# Patient Record
Sex: MALE | NOT HISPANIC OR LATINO | URBAN - METROPOLITAN AREA
[De-identification: names, ages, dates, MRNs, and addresses within clinical notes are randomized per-mention and may not be internally consistent; named-entity substitution may affect disease eponyms.]

---

## 2019-05-15 ENCOUNTER — OFFICE (OUTPATIENT)
Dept: URBAN - METROPOLITAN AREA PATHOLOGY 4 | Facility: PATHOLOGY | Age: 24
End: 2019-05-15

## 2019-05-15 ENCOUNTER — AMBULATORY SURGICAL CENTER (OUTPATIENT)
Dept: URBAN - METROPOLITAN AREA SURGERY 20 | Facility: SURGERY | Age: 24
End: 2019-05-15

## 2019-05-15 DIAGNOSIS — K21.0 GASTRO-ESOPHAGEAL REFLUX DISEASE WITH ESOPHAGITIS: ICD-10-CM

## 2019-05-15 DIAGNOSIS — K29.50 UNSPECIFIED CHRONIC GASTRITIS WITHOUT BLEEDING: ICD-10-CM

## 2019-05-15 DIAGNOSIS — R12 HEARTBURN: ICD-10-CM

## 2019-05-15 PROBLEM — K29.70 GASTRITIS, UNSPECIFIED, WITHOUT BLEEDING: Status: ACTIVE | Noted: 2019-05-15

## 2019-05-15 LAB
GI HISTOLOGY: A. SELECT: (no result)
GI HISTOLOGY: B. SELECT: (no result)
GI HISTOLOGY: PDF REPORT: (no result)

## 2019-05-15 PROCEDURE — 88305 TISSUE EXAM BY PATHOLOGIST: CPT

## 2019-05-15 PROCEDURE — 43450 DILATE ESOPHAGUS 1/MULT PASS: CPT

## 2019-05-15 PROCEDURE — 43239 EGD BIOPSY SINGLE/MULTIPLE: CPT

## 2019-05-15 NOTE — SERVICEHPINOTES
JONN MCPHERSON  is a  23  male   who presents today for a  EGD   for   the indications listed below. The updated Patient Profile was reviewed prior to the procedure, in conjunction with the Physical Exam, including medical conditions, surgical procedures, medications, allergies, family history and social history. See Physical Exam time stamp below for date and time of HPI completion.Pre-operatively, I reviewed the indication(s) for the procedure, the risks of the procedure [including but not limited to: unexpected bleeding possibly requiring hospitalization and/or unplanned repeat procedures, perforation possibly requiring surgical treatment, missed lesions and complications of sedation/MAC (also explained by anesthesia staff)]. I have evaluated the patient for risks associated with the planned anesthesia and the procedure to be performed and find the patient an acceptable candidate for IV sedation.Multiple opportunities were provided for any questions or concerns, and all questions were answered satisfactorily before any anesthesia was administered. We will proceed with the planned procedure.BR

## 2019-07-13 ENCOUNTER — APPOINTMENT (OUTPATIENT)
Dept: GENERAL RADIOLOGY | Facility: HOSPITAL | Age: 24
End: 2019-07-13

## 2019-07-13 ENCOUNTER — HOSPITAL ENCOUNTER (EMERGENCY)
Facility: HOSPITAL | Age: 24
Discharge: HOME | End: 2019-07-14
Attending: EMERGENCY MEDICINE | Admitting: EMERGENCY MEDICINE

## 2019-07-13 DIAGNOSIS — F10.920 ACUTE ALCOHOLIC INTOXICATION WITHOUT COMPLICATION (HCC): ICD-10-CM

## 2019-07-13 DIAGNOSIS — R00.2 PALPITATIONS: Primary | ICD-10-CM

## 2019-07-13 LAB
ALBUMIN SERPL-MCNC: 4.4 G/DL (ref 3.5–5.2)
ALBUMIN/GLOB SERPL: 1.5 G/DL
ALP SERPL-CCNC: 75 U/L (ref 39–117)
ALT SERPL W P-5'-P-CCNC: <5 U/L (ref 1–41)
ANION GAP SERPL CALCULATED.3IONS-SCNC: 17.8 MMOL/L (ref 5–15)
AST SERPL-CCNC: 29 U/L (ref 1–40)
BASOPHILS # BLD AUTO: 0.04 10*3/MM3 (ref 0–0.2)
BASOPHILS NFR BLD AUTO: 0.5 % (ref 0–1.5)
BILIRUB SERPL-MCNC: <0.2 MG/DL (ref 0.2–1.2)
BUN BLD-MCNC: 14 MG/DL (ref 6–20)
BUN/CREAT SERPL: 10.9 (ref 7–25)
CALCIUM SPEC-SCNC: 8.9 MG/DL (ref 8.6–10.5)
CHLORIDE SERPL-SCNC: 104 MMOL/L (ref 98–107)
CO2 SERPL-SCNC: 21.2 MMOL/L (ref 22–29)
CREAT BLD-MCNC: 1.29 MG/DL (ref 0.76–1.27)
DEPRECATED RDW RBC AUTO: 38.3 FL (ref 37–54)
EOSINOPHIL # BLD AUTO: 0.21 10*3/MM3 (ref 0–0.4)
EOSINOPHIL NFR BLD AUTO: 2.7 % (ref 0.3–6.2)
ERYTHROCYTE [DISTWIDTH] IN BLOOD BY AUTOMATED COUNT: 11.8 % (ref 12.3–15.4)
ETHANOL BLD-MCNC: 257 MG/DL (ref 0–10)
ETHANOL UR QL: 0.26 %
GFR SERPL CREATININE-BSD FRML MDRD: 68 ML/MIN/1.73
GLOBULIN UR ELPH-MCNC: 3 GM/DL
GLUCOSE BLD-MCNC: 132 MG/DL (ref 65–99)
HCT VFR BLD AUTO: 44.9 % (ref 37.5–51)
HGB BLD-MCNC: 16 G/DL (ref 13–17.7)
IMM GRANULOCYTES # BLD AUTO: 0.02 10*3/MM3 (ref 0–0.05)
IMM GRANULOCYTES NFR BLD AUTO: 0.3 % (ref 0–0.5)
LYMPHOCYTES # BLD AUTO: 2.45 10*3/MM3 (ref 0.7–3.1)
LYMPHOCYTES NFR BLD AUTO: 31.5 % (ref 19.6–45.3)
MAGNESIUM SERPL-MCNC: 2.4 MG/DL (ref 1.6–2.6)
MCH RBC QN AUTO: 32.1 PG (ref 26.6–33)
MCHC RBC AUTO-ENTMCNC: 35.6 G/DL (ref 31.5–35.7)
MCV RBC AUTO: 90 FL (ref 79–97)
MONOCYTES # BLD AUTO: 0.75 10*3/MM3 (ref 0.1–0.9)
MONOCYTES NFR BLD AUTO: 9.7 % (ref 5–12)
NEUTROPHILS # BLD AUTO: 4.3 10*3/MM3 (ref 1.7–7)
NEUTROPHILS NFR BLD AUTO: 55.3 % (ref 42.7–76)
NRBC BLD AUTO-RTO: 0 /100 WBC (ref 0–0.2)
PLATELET # BLD AUTO: 258 10*3/MM3 (ref 140–450)
PMV BLD AUTO: 10.3 FL (ref 6–12)
POTASSIUM BLD-SCNC: 4 MMOL/L (ref 3.5–5.2)
PROT SERPL-MCNC: 7.4 G/DL (ref 6–8.5)
RBC # BLD AUTO: 4.99 10*6/MM3 (ref 4.14–5.8)
SODIUM BLD-SCNC: 143 MMOL/L (ref 136–145)
TSH SERPL DL<=0.05 MIU/L-ACNC: 1.45 MIU/ML (ref 0.27–4.2)
WBC NRBC COR # BLD: 7.77 10*3/MM3 (ref 3.4–10.8)

## 2019-07-13 PROCEDURE — 80307 DRUG TEST PRSMV CHEM ANLYZR: CPT | Performed by: NURSE PRACTITIONER

## 2019-07-13 PROCEDURE — 93010 ELECTROCARDIOGRAM REPORT: CPT | Performed by: INTERNAL MEDICINE

## 2019-07-13 PROCEDURE — 99284 EMERGENCY DEPT VISIT MOD MDM: CPT

## 2019-07-13 PROCEDURE — 83735 ASSAY OF MAGNESIUM: CPT | Performed by: NURSE PRACTITIONER

## 2019-07-13 PROCEDURE — 84443 ASSAY THYROID STIM HORMONE: CPT | Performed by: NURSE PRACTITIONER

## 2019-07-13 PROCEDURE — 93005 ELECTROCARDIOGRAM TRACING: CPT | Performed by: NURSE PRACTITIONER

## 2019-07-13 PROCEDURE — 85025 COMPLETE CBC W/AUTO DIFF WBC: CPT | Performed by: NURSE PRACTITIONER

## 2019-07-13 PROCEDURE — 93005 ELECTROCARDIOGRAM TRACING: CPT | Performed by: EMERGENCY MEDICINE

## 2019-07-13 PROCEDURE — 71046 X-RAY EXAM CHEST 2 VIEWS: CPT

## 2019-07-13 PROCEDURE — 93005 ELECTROCARDIOGRAM TRACING: CPT

## 2019-07-13 PROCEDURE — 80053 COMPREHEN METABOLIC PANEL: CPT | Performed by: NURSE PRACTITIONER

## 2019-07-13 RX ORDER — OMEPRAZOLE 20 MG/1
20 CAPSULE, DELAYED RELEASE ORAL 2 TIMES DAILY
COMMUNITY
End: 2020-05-12

## 2019-07-13 RX ORDER — ASPIRIN 81 MG/1
81 TABLET, CHEWABLE ORAL AS NEEDED
COMMUNITY
End: 2022-04-04

## 2019-07-13 RX ORDER — VILAZODONE HYDROCHLORIDE 20 MG/1
1 TABLET ORAL DAILY
COMMUNITY
Start: 2019-06-27 | End: 2022-04-04

## 2019-07-13 RX ORDER — SODIUM CHLORIDE 0.9 % (FLUSH) 0.9 %
10 SYRINGE (ML) INJECTION AS NEEDED
Status: DISCONTINUED | OUTPATIENT
Start: 2019-07-13 | End: 2019-07-14 | Stop reason: HOSPADM

## 2019-07-13 RX ADMIN — SODIUM CHLORIDE 1000 ML: 9 INJECTION, SOLUTION INTRAVENOUS at 22:34

## 2019-07-14 VITALS
BODY MASS INDEX: 29.55 KG/M2 | HEIGHT: 67 IN | WEIGHT: 188.3 LBS | OXYGEN SATURATION: 93 % | DIASTOLIC BLOOD PRESSURE: 61 MMHG | TEMPERATURE: 97.8 F | HEART RATE: 102 BPM | RESPIRATION RATE: 18 BRPM | SYSTOLIC BLOOD PRESSURE: 117 MMHG

## 2019-07-14 LAB
AMPHET+METHAMPHET UR QL: NEGATIVE
BARBITURATES UR QL SCN: NEGATIVE
BENZODIAZ UR QL SCN: NEGATIVE
CANNABINOIDS SERPL QL: NEGATIVE
COCAINE UR QL: NEGATIVE
METHADONE UR QL SCN: NEGATIVE
OPIATES UR QL: NEGATIVE
OXYCODONE UR QL SCN: NEGATIVE

## 2019-07-14 RX ORDER — OLANZAPINE 10 MG/1
10 INJECTION, POWDER, LYOPHILIZED, FOR SOLUTION INTRAMUSCULAR ONCE
Status: DISCONTINUED | OUTPATIENT
Start: 2019-07-14 | End: 2019-07-14

## 2019-07-14 RX ADMIN — SODIUM CHLORIDE 1000 ML: 9 INJECTION, SOLUTION INTRAVENOUS at 00:02

## 2019-07-14 NOTE — ED NOTES
Pt mother Johanny called for update on pt condition. This RN obtained verbal consent from the pt to speak to his mother regarding his care.      Zuri Abdul RN  07/14/19 0059

## 2019-07-14 NOTE — ED NOTES
Pt reminded once more of need for urine specimen. Pt states he is still not able to urinate, pt requesting water to drink. IV bolus of NS currently infusing. RANDY Keating notified. No new orders. Will continue to monitor.           Zuri Abdul, RN  07/13/19 5296

## 2019-07-14 NOTE — ED NOTES
"Pt reports chest discomfort and \"populations in the heart for about a year.\" This RN clarified that pt meant to state \"palpitations.\" Pt states he came to the ED tonight for pain control because it's gotten so uncomfortable that he \"can't stand it.\" Pt is A&O x4, ambulating without difficulty.  Pt intermittently slurs words, speaks with eyes closed. Sinus tach, . /77. SPO2 93% on RA.          Zuri Abdul RN  07/13/19 9352    "

## 2019-07-14 NOTE — ED TRIAGE NOTES
"Pt states his heart has been racing \"for a month\". Pt is a very poor historian, difficulty getting an accurate hx. Denies any caffeine or drug use, denies ingesting energy drinks, reports he is taking Vybrid for depression.  "

## 2019-07-14 NOTE — DISCHARGE INSTRUCTIONS
Home to rest  Drink plenty of fluids  Follow up with your doctor  Avoid caffeine, over the counter stimulants, cough and cold medications, supplements and alcohol. These may increase your palpitations.   Return if worse or new concerns   Continue care with your primary care physician and have your blood pressure regularly checked and managed. Normal blood pressure is 120/80.

## 2019-07-14 NOTE — ED PROVIDER NOTES
" EMERGENCY DEPARTMENT ENCOUNTER    CHIEF COMPLAINT  Chief Complaint: palpitations  History given by: patient  History limited by: patient is a poor historian  Room Number: 29/29  PMD: Jackson Lee MD      HPI:  Pt is a 24 y.o. male who presents complaining of worsening, \"beating out of my chest\" heart palpitations \"for the past year\". He also reports associated chest \"burning\". Today, he reports that both the palpitations and burning felt worse, prompting him to come to the ED. Denies SI/HI, head injury, recent falls. Pt denies smoking and illicit drug use, but reports he has consumed about 6 beers today.     PAST MEDICAL HISTORY  Active Ambulatory Problems     Diagnosis Date Noted   • No Active Ambulatory Problems     Resolved Ambulatory Problems     Diagnosis Date Noted   • No Resolved Ambulatory Problems     No Additional Past Medical History       PAST SURGICAL HISTORY  History reviewed. No pertinent surgical history.    FAMILY HISTORY  History reviewed. No pertinent family history.    SOCIAL HISTORY  Social History     Socioeconomic History   • Marital status: Single     Spouse name: Not on file   • Number of children: Not on file   • Years of education: Not on file   • Highest education level: Not on file   Tobacco Use   • Smoking status: Former Smoker   • Smokeless tobacco: Current User   • Tobacco comment: Uses E-cigarettes    Substance and Sexual Activity   • Alcohol use: No     Frequency: Never   • Drug use: No   • Sexual activity: Defer       ALLERGIES  Patient has no known allergies.    REVIEW OF SYSTEMS  Review of Systems   Constitutional: Negative for fatigue and fever.   HENT: Negative for congestion and sore throat.    Eyes: Negative for visual disturbance.   Respiratory: Negative for cough, shortness of breath and wheezing.    Cardiovascular: Positive for chest pain and palpitations (\"beating out of my chest\").   Gastrointestinal: Negative for abdominal pain, diarrhea, nausea and vomiting. "   Genitourinary: Negative for dysuria, frequency and urgency.   Musculoskeletal: Negative for arthralgias, back pain and myalgias.   Skin: Negative for rash.   Neurological: Negative for dizziness, syncope, weakness and headaches.   Psychiatric/Behavioral: Negative for confusion and self-injury. The patient is not nervous/anxious.        PHYSICAL EXAM  ED Triage Vitals   Temp Heart Rate Resp BP SpO2   07/13/19 2117 07/13/19 2117 07/13/19 2117 07/13/19 2130 07/13/19 2117   97.8 °F (36.6 °C) (!) 138 16 130/77 96 %      Temp src Heart Rate Source Patient Position BP Location FiO2 (%)   07/13/19 2117 07/13/19 2117 -- -- --   Tympanic Monitor          Physical Exam   Constitutional: He is oriented to person, place, and time and well-developed, well-nourished, and in no distress. No distress.   Pt smells of EtOH   HENT:   Head: Normocephalic and atraumatic.   Right Ear: Tympanic membrane normal.   Left Ear: Tympanic membrane normal.   Nose: Nose normal.   Mouth/Throat: Uvula is midline, oropharynx is clear and moist and mucous membranes are normal.   Eyes: Conjunctivae, EOM and lids are normal. Pupils are equal, round, and reactive to light.   Cardiovascular: Regular rhythm. Tachycardia present.   Pulmonary/Chest: Effort normal and breath sounds normal.   Abdominal: Soft. Normal appearance. There is no tenderness.   Neurological: He is alert and oriented to person, place, and time. He displays abnormal speech (slurred).   Skin: Skin is warm, dry and intact.   Psychiatric: Mood, memory, affect and judgment normal.       LAB RESULTS  Lab Results (last 24 hours)     Procedure Component Value Units Date/Time    CBC & Differential [[id number]] Collected:  07/13/19 2145    Specimen:  Blood Updated:  07/13/19 2155    Narrative:       The following orders were created for panel order CBC & Differential.  Procedure                               Abnormality         Status                     ---------                                -----------         ------                     CBC Auto Differential[[id number]]        Abnormal            Final result                 Please view results for these tests on the individual orders.    Comprehensive Metabolic Panel [[id number]]  (Abnormal) Collected:  07/13/19 2145    Specimen:  Blood from Arm, Right Updated:  07/13/19 2229     Glucose 132 mg/dL      BUN 14 mg/dL      Creatinine 1.29 mg/dL      Sodium 143 mmol/L      Potassium 4.0 mmol/L      Chloride 104 mmol/L      CO2 21.2 mmol/L      Calcium 8.9 mg/dL      Total Protein 7.4 g/dL      Albumin 4.40 g/dL      ALT (SGPT) <5 U/L      AST (SGOT) 29 U/L      Comment: Specimen hemolyzed.  Results may be affected.        Alkaline Phosphatase 75 U/L      Total Bilirubin <0.2 mg/dL      eGFR Non African Amer 68 mL/min/1.73      Globulin 3.0 gm/dL      A/G Ratio 1.5 g/dL      BUN/Creatinine Ratio 10.9     Anion Gap 17.8 mmol/L     Narrative:       GFR Normal >60  Chronic Kidney Disease <60  Kidney Failure <15    Magnesium [[id number]]  (Normal) Collected:  07/13/19 2145    Specimen:  Blood from Arm, Right Updated:  07/13/19 2218     Magnesium 2.4 mg/dL     TSH [[id number]]  (Normal) Collected:  07/13/19 2145    Specimen:  Blood from Arm, Right Updated:  07/13/19 2225     TSH 1.450 mIU/mL     CBC Auto Differential [[id number]]  (Abnormal) Collected:  07/13/19 2145    Specimen:  Blood from Arm, Right Updated:  07/13/19 2155     WBC 7.77 10*3/mm3      RBC 4.99 10*6/mm3      Hemoglobin 16.0 g/dL      Hematocrit 44.9 %      MCV 90.0 fL      MCH 32.1 pg      MCHC 35.6 g/dL      RDW 11.8 %      RDW-SD 38.3 fl      MPV 10.3 fL      Platelets 258 10*3/mm3      Neutrophil % 55.3 %      Lymphocyte % 31.5 %      Monocyte % 9.7 %      Eosinophil % 2.7 %      Basophil % 0.5 %      Immature Grans % 0.3 %      Neutrophils, Absolute 4.30 10*3/mm3      Lymphocytes, Absolute 2.45 10*3/mm3      Monocytes, Absolute 0.75 10*3/mm3      Eosinophils, Absolute 0.21 10*3/mm3       Basophils, Absolute 0.04 10*3/mm3      Immature Grans, Absolute 0.02 10*3/mm3      nRBC 0.0 /100 WBC     Ethanol [[id number]]  (Abnormal) Collected:  07/13/19 2254    Specimen:  Blood Updated:  07/13/19 2322     Ethanol 257 mg/dL      Ethanol % 0.257 %     Urine Drug Screen - Urine, Clean Catch [[id number]]  (Normal) Collected:  07/13/19 2326    Specimen:  Urine, Clean Catch Updated:  07/14/19 0020     Amphet/Methamphet, Screen Negative     Barbiturates Screen, Urine Negative     Benzodiazepine Screen, Urine Negative     Cocaine Screen, Urine Negative     Opiate Screen Negative     THC, Screen, Urine Negative     Methadone Screen, Urine Negative     Oxycodone Screen, Urine Negative    Narrative:       Negative Thresholds For Drugs Screened:     Amphetamines               500 ng/ml   Barbiturates               200 ng/ml   Benzodiazepines            100 ng/ml   Cocaine                    300 ng/ml   Methadone                  300 ng/ml   Opiates                    300 ng/ml   Oxycodone                  100 ng/ml   THC                        50 ng/ml    The Normal Value for all drugs tested is negative. This report includes final unconfirmed screening results to be used for medical treatment purposes only. Unconfirmed results must not be used for non-medical purposes such as employment or legal testing. Clinical consideration should be applied to any drug of abuse test, particulary when unconfirmed results are used.          I ordered the above labs and reviewed the results    RADIOLOGY  XR Chest 2 View   Final Result   1. No active disease.       This report was finalized on 7/13/2019 10:25 PM by Escobar Lord M.D.               I ordered the above noted radiological studies. Interpreted by radiologist.  Reviewed by me in PACS.       PROCEDURES  Procedures  EKG          EKG time: 2247  Rhythm/Rate: sinus tach 108  P waves and TX: nml  QRS, axis: nml   ST and T waves: nml, no ST elevation     Interpreted  Contemporaneously by me, independently viewed  Unchanged compared to prior done today at 2120.     EKG          EKG time: 2120  Rhythm/Rate: sinus tach 125  P waves and AZ: nml  QRS, axis: nml   ST and T waves: nml, no ST elevation     Interpreted Contemporaneously by me, independently viewed  No prior     PROGRESS AND CONSULTS     2153- Ordered IVF, CXR, labs, and EKG.     2349- Ordered IVF.     2353- Discussed pt with Dr. Mensah, who, after a beside evaluation of the pt, agrees with the course of care.     0056- BP- 117/61 HR- 95 Temp- 97.8 °F (36.6 °C) (Tympanic) O2 sat- 94%  Rechecked pt. Pt is resting comfortably. Notified pt of his EKG, CXR, and lab results. Discussed the plan to discharge the pt home. I instructed the pt to f/u with his PCP. RTED instructions given. Pt understands and agrees with the plan, all questions answered.     MEDICAL DECISION MAKING  Results were reviewed/discussed with the patient and they were also made aware of online access. Pt also made aware that some labs, such as cultures, will not be resulted during ER visit and follow up with PMD is necessary.     MDM  Number of Diagnoses or Management Options     Amount and/or Complexity of Data Reviewed  Clinical lab tests: ordered and reviewed (EtOH 257)  Tests in the radiology section of CPT®: ordered and reviewed (CXR- NAD)  Tests in the medicine section of CPT®: reviewed and ordered (See EKG note.)  Discuss the patient with other providers: yes (Dr. Mensah (Emergency Medicine))  Independent visualization of images, tracings, or specimens: yes    Patient Progress  Patient progress: stable         DIAGNOSIS  Final diagnoses:   Acute alcoholic intoxication without complication (CMS/HCC)   Palpitations       DISPOSITION  DISCHARGE    Patient discharged in stable condition.    Reviewed implications of results, diagnosis, meds, responsibility to follow up, warning signs and symptoms of possible worsening, potential complications and reasons to  return to ER.    Patient/Family voiced understanding of above instructions.    Discussed plan for discharge, as there is no emergent indication for admission. Patient referred to primary care provider for BP management due to today's BP. Pt/family is agreeable and understands need for follow up and repeat testing.  Pt is aware that discharge does not mean that nothing is wrong but it indicates no emergency is present that requires admission and they must continue care with follow-up as given below or physician of their choice.     FOLLOW-UP  Jackson Lee MD  [address] [facility]  [place]. [address] [place] [address]  [phone]    Call            Medication List      No changes were made to your prescriptions during this visit.           Latest Documented Vital Signs:  As of 12:57 AM  BP- 117/61 HR- 95 Temp- 97.8 °F (36.6 °C) (Tympanic) O2 sat- 94%    --  Documentation assistance provided by beatriz Tavarez for RANDY Lr.  Information recorded by the scribe was done at my direction and has been verified and validated by me.     Miguel Tavarez  07/14/19 0057       Zuri De Oliveira APRN  07/14/19 0536

## 2019-07-14 NOTE — ED NOTES
Pt was able to contact his mayte for . Pt reports he is upset that his mother was told that he was intoxicated. Pt stated several times that he did give permission for us to update his mother, but he didn't know she would be told that he was intoxicated. Pts mother called back and pt was able to speak with her. He raised his voiced several times while on the phone. Pt is now awaiting .      Nancy Sanabria, RN  07/14/19 0144

## 2019-07-14 NOTE — ED NOTES
Pt advised urine specimen is needed. Pt states he is not able to urinate at this time. Advised pt to alert nursing staff when able to do so. Call light within reach. Will continue to monitor.      Zuri Abdul RN  07/13/19 3192

## 2020-01-23 ENCOUNTER — HOSPITAL ENCOUNTER (EMERGENCY)
Facility: HOSPITAL | Age: 25
Discharge: HOME | End: 2020-01-23
Attending: EMERGENCY MEDICINE | Admitting: EMERGENCY MEDICINE

## 2020-01-23 ENCOUNTER — APPOINTMENT (OUTPATIENT)
Dept: GENERAL RADIOLOGY | Facility: HOSPITAL | Age: 25
End: 2020-01-23

## 2020-01-23 VITALS
HEART RATE: 90 BPM | WEIGHT: 180 LBS | DIASTOLIC BLOOD PRESSURE: 107 MMHG | RESPIRATION RATE: 18 BRPM | HEIGHT: 68 IN | TEMPERATURE: 98.7 F | OXYGEN SATURATION: 96 % | SYSTOLIC BLOOD PRESSURE: 150 MMHG | BODY MASS INDEX: 27.28 KG/M2

## 2020-01-23 DIAGNOSIS — R07.89 ATYPICAL CHEST PAIN: Primary | ICD-10-CM

## 2020-01-23 DIAGNOSIS — R00.2 PALPITATIONS: ICD-10-CM

## 2020-01-23 LAB
AMPHET+METHAMPHET UR QL: NEGATIVE
ANION GAP SERPL CALCULATED.3IONS-SCNC: 12.2 MMOL/L (ref 5–15)
BARBITURATES UR QL SCN: NEGATIVE
BASOPHILS # BLD AUTO: 0.04 10*3/MM3 (ref 0–0.2)
BASOPHILS NFR BLD AUTO: 0.6 % (ref 0–1.5)
BENZODIAZ UR QL SCN: NEGATIVE
BUN BLD-MCNC: 17 MG/DL (ref 6–20)
BUN/CREAT SERPL: 15.9 (ref 7–25)
CALCIUM SPEC-SCNC: 9.5 MG/DL (ref 8.6–10.5)
CANNABINOIDS SERPL QL: NEGATIVE
CHLORIDE SERPL-SCNC: 101 MMOL/L (ref 98–107)
CO2 SERPL-SCNC: 26.8 MMOL/L (ref 22–29)
COCAINE UR QL: NEGATIVE
CREAT BLD-MCNC: 1.07 MG/DL (ref 0.76–1.27)
D DIMER PPP FEU-MCNC: <0.27 MCGFEU/ML (ref 0–0.49)
DEPRECATED RDW RBC AUTO: 41.3 FL (ref 37–54)
EOSINOPHIL # BLD AUTO: 0.15 10*3/MM3 (ref 0–0.4)
EOSINOPHIL NFR BLD AUTO: 2.1 % (ref 0.3–6.2)
ERYTHROCYTE [DISTWIDTH] IN BLOOD BY AUTOMATED COUNT: 12.4 % (ref 12.3–15.4)
ETHANOL BLD-MCNC: <10 MG/DL (ref 0–10)
ETHANOL UR QL: <0.01 %
GFR SERPL CREATININE-BSD FRML MDRD: 84 ML/MIN/1.73
GLUCOSE BLD-MCNC: 108 MG/DL (ref 65–99)
HCT VFR BLD AUTO: 46.9 % (ref 37.5–51)
HGB BLD-MCNC: 16.5 G/DL (ref 13–17.7)
IMM GRANULOCYTES # BLD AUTO: 0.02 10*3/MM3 (ref 0–0.05)
IMM GRANULOCYTES NFR BLD AUTO: 0.3 % (ref 0–0.5)
LYMPHOCYTES # BLD AUTO: 1.13 10*3/MM3 (ref 0.7–3.1)
LYMPHOCYTES NFR BLD AUTO: 16.2 % (ref 19.6–45.3)
MCH RBC QN AUTO: 32.2 PG (ref 26.6–33)
MCHC RBC AUTO-ENTMCNC: 35.2 G/DL (ref 31.5–35.7)
MCV RBC AUTO: 91.6 FL (ref 79–97)
METHADONE UR QL SCN: NEGATIVE
MONOCYTES # BLD AUTO: 0.67 10*3/MM3 (ref 0.1–0.9)
MONOCYTES NFR BLD AUTO: 9.6 % (ref 5–12)
NEUTROPHILS # BLD AUTO: 4.97 10*3/MM3 (ref 1.7–7)
NEUTROPHILS NFR BLD AUTO: 71.2 % (ref 42.7–76)
NRBC BLD AUTO-RTO: 0 /100 WBC (ref 0–0.2)
OPIATES UR QL: NEGATIVE
OXYCODONE UR QL SCN: NEGATIVE
PLATELET # BLD AUTO: 215 10*3/MM3 (ref 140–450)
PMV BLD AUTO: 9.7 FL (ref 6–12)
POTASSIUM BLD-SCNC: 4.5 MMOL/L (ref 3.5–5.2)
RBC # BLD AUTO: 5.12 10*6/MM3 (ref 4.14–5.8)
SODIUM BLD-SCNC: 140 MMOL/L (ref 136–145)
TROPONIN T SERPL-MCNC: <0.01 NG/ML (ref 0–0.03)
WBC NRBC COR # BLD: 6.98 10*3/MM3 (ref 3.4–10.8)

## 2020-01-23 PROCEDURE — 85379 FIBRIN DEGRADATION QUANT: CPT | Performed by: NURSE PRACTITIONER

## 2020-01-23 PROCEDURE — 80307 DRUG TEST PRSMV CHEM ANLYZR: CPT | Performed by: NURSE PRACTITIONER

## 2020-01-23 PROCEDURE — 99283 EMERGENCY DEPT VISIT LOW MDM: CPT

## 2020-01-23 PROCEDURE — 84484 ASSAY OF TROPONIN QUANT: CPT | Performed by: NURSE PRACTITIONER

## 2020-01-23 PROCEDURE — 93005 ELECTROCARDIOGRAM TRACING: CPT

## 2020-01-23 PROCEDURE — 71046 X-RAY EXAM CHEST 2 VIEWS: CPT

## 2020-01-23 PROCEDURE — 93010 ELECTROCARDIOGRAM REPORT: CPT | Performed by: INTERNAL MEDICINE

## 2020-01-23 PROCEDURE — 80048 BASIC METABOLIC PNL TOTAL CA: CPT | Performed by: NURSE PRACTITIONER

## 2020-01-23 PROCEDURE — 85025 COMPLETE CBC W/AUTO DIFF WBC: CPT | Performed by: NURSE PRACTITIONER

## 2020-01-23 PROCEDURE — 93005 ELECTROCARDIOGRAM TRACING: CPT | Performed by: EMERGENCY MEDICINE

## 2020-01-23 NOTE — ED PROVIDER NOTES
"  EMERGENCY DEPARTMENT ENCOUNTER    CHIEF COMPLAINT  Chief Complaint: Chest Pain   History given by:Patient   History limited by:none   Time Seen: 0848  Room Number: 24/24  PMD: True Smallwood MD      HPI:  Pt is a 24 y.o. male who presents with ongoing intermittent chest pain for the past 18 months, worsening steadily over the past month. Pt states he had \"heart skipping\" palpitations yesterday so decided to present to the ED again today. Pt affirms palpitations and pain have resolved without intervention. Per pt, he has been evaluated in ED 15 months ago and 3 months ago for same pain, without any acute findings. Pt states he has had no prior cardiology follow up for same. Pt affirms he takes 81mg ASA daily, but denies any relief. Pt denies recreational drug usage and states he is a social drinker. Per pt, he has had no recent long trips and does not consume any caffeine. Pt denies prior medical hx of family hx of heart problems.     Duration: 18 months   Timing:intermittent   Location:chest   Radiation:none   Quality:pain   Intensity/Severity:mild   Progression:worse over the past month   Associated Symptoms:palpitations   Aggravating Factors:none   Alleviating Factors:none   Previous Episodes:Pt has been seen for same, without any findings.   Treatment before arrival:none     PAST MEDICAL HISTORY  Active Ambulatory Problems     Diagnosis Date Noted   • No Active Ambulatory Problems     Resolved Ambulatory Problems     Diagnosis Date Noted   • No Resolved Ambulatory Problems     No Additional Past Medical History       PAST SURGICAL HISTORY  No past surgical history on file.    FAMILY HISTORY  No family history on file.    SOCIAL HISTORY  Social History     Socioeconomic History   • Marital status: Single     Spouse name: Not on file   • Number of children: Not on file   • Years of education: Not on file   • Highest education level: Not on file   Tobacco Use   • Smoking status: Former Smoker   • Smokeless " tobacco: Current User   • Tobacco comment: Uses E-cigarettes    Substance and Sexual Activity   • Alcohol use: No     Frequency: Never   • Drug use: No   • Sexual activity: Defer         ALLERGIES  Patient has no known allergies.    REVIEW OF SYSTEMS  Review of Systems   Constitutional: Negative for chills and fever.   HENT: Negative for sore throat.    Cardiovascular: Positive for chest pain and palpitations.   Gastrointestinal: Negative for nausea and vomiting.   Genitourinary: Negative for dysuria.   Musculoskeletal: Negative for back pain.   Skin: Negative for rash.   Psychiatric/Behavioral: The patient is not nervous/anxious.        PHYSICAL EXAM  ED Triage Vitals   Temp Heart Rate Resp BP SpO2   01/23/20 0729 01/23/20 0729 01/23/20 0729 01/23/20 0826 01/23/20 0729   98.7 °F (37.1 °C) (!) 134 15 (!) 145/101 98 %       Physical Exam   Constitutional: No distress.   HENT:   Head: Normocephalic and atraumatic.   Eyes: Conjunctivae are normal.   Neck: Normal range of motion.   Cardiovascular: Normal rate and regular rhythm.   Pulmonary/Chest: Breath sounds normal. No respiratory distress.   Abdominal: There is no tenderness.   Musculoskeletal: He exhibits no edema or tenderness.   Neurological: He is alert.   Skin: No rash noted.   Nursing note and vitals reviewed.      LAB RESULTS  Recent Results (from the past 24 hour(s))   Basic Metabolic Panel    Collection Time: 01/23/20  9:42 AM   Result Value Ref Range    Glucose 108 (H) 65 - 99 mg/dL    BUN 17 6 - 20 mg/dL    Creatinine 1.07 0.76 - 1.27 mg/dL    Sodium 140 136 - 145 mmol/L    Potassium 4.5 3.5 - 5.2 mmol/L    Chloride 101 98 - 107 mmol/L    CO2 26.8 22.0 - 29.0 mmol/L    Calcium 9.5 8.6 - 10.5 mg/dL    eGFR Non African Amer 84 >60 mL/min/1.73    BUN/Creatinine Ratio 15.9 7.0 - 25.0    Anion Gap 12.2 5.0 - 15.0 mmol/L   Troponin    Collection Time: 01/23/20  9:42 AM   Result Value Ref Range    Troponin T <0.010 0.000 - 0.030 ng/mL   Ethanol    Collection  Time: 01/23/20  9:42 AM   Result Value Ref Range    Ethanol <10 0 - 10 mg/dL    Ethanol % <0.010 %   CBC Auto Differential    Collection Time: 01/23/20  9:42 AM   Result Value Ref Range    WBC 6.98 3.40 - 10.80 10*3/mm3    RBC 5.12 4.14 - 5.80 10*6/mm3    Hemoglobin 16.5 13.0 - 17.7 g/dL    Hematocrit 46.9 37.5 - 51.0 %    MCV 91.6 79.0 - 97.0 fL    MCH 32.2 26.6 - 33.0 pg    MCHC 35.2 31.5 - 35.7 g/dL    RDW 12.4 12.3 - 15.4 %    RDW-SD 41.3 37.0 - 54.0 fl    MPV 9.7 6.0 - 12.0 fL    Platelets 215 140 - 450 10*3/mm3    Neutrophil % 71.2 42.7 - 76.0 %    Lymphocyte % 16.2 (L) 19.6 - 45.3 %    Monocyte % 9.6 5.0 - 12.0 %    Eosinophil % 2.1 0.3 - 6.2 %    Basophil % 0.6 0.0 - 1.5 %    Immature Grans % 0.3 0.0 - 0.5 %    Neutrophils, Absolute 4.97 1.70 - 7.00 10*3/mm3    Lymphocytes, Absolute 1.13 0.70 - 3.10 10*3/mm3    Monocytes, Absolute 0.67 0.10 - 0.90 10*3/mm3    Eosinophils, Absolute 0.15 0.00 - 0.40 10*3/mm3    Basophils, Absolute 0.04 0.00 - 0.20 10*3/mm3    Immature Grans, Absolute 0.02 0.00 - 0.05 10*3/mm3    nRBC 0.0 0.0 - 0.2 /100 WBC   Urine Drug Screen - Urine, Clean Catch    Collection Time: 01/23/20 10:16 AM   Result Value Ref Range    Amphet/Methamphet, Screen Negative Negative    Barbiturates Screen, Urine Negative Negative    Benzodiazepine Screen, Urine Negative Negative    Cocaine Screen, Urine Negative Negative    Opiate Screen Negative Negative    THC, Screen, Urine Negative Negative    Methadone Screen, Urine Negative Negative    Oxycodone Screen, Urine Negative Negative   D-dimer, Quantitative    Collection Time: 01/23/20 11:51 AM   Result Value Ref Range    D-Dimer, Quantitative <0.27 0.00 - 0.49 MCGFEU/mL       I ordered the above labs and reviewed the results    RADIOLOGY  XR Chest 2 View   Preliminary Result       No active disease in the chest.              I ordered the above noted radiological studies and reviewed the images on the PACS system.      EKG    ekg was interpreted by   Elias, see Dr. Griffin's note for interpretation.      PROGRESS AND CONSULTS     0854: Upon pt exam, discussed plan for lab workup and CXR in ED for further evaluation. Informed pt of plan that if workup is negative, pt will need to follow up with Cardiology outpatient.     0907: Labs ordered for further evaluation.     1045: Reviewed pt's history and workup with Dr. Griffin.  At bedside evaluation, they agree with the plan of care.    1221: Pt rechecked and resting comfortably. Discussed negative acute findings of lab workup and CXR, but due to pt's persistent pain, the plan for outpatient Cardiology follow up. Pt states he scheduled an appointment for 4 days from now and I directed him to maintain that appointment. Pt understands and agrees with the plan, all questions answered.    Reviewed implications of results, diagnosis, meds, responsibility to follow up, warning signs and symptoms of possible worsening, potential complications and reasons to return to ER with patient.  Discussed all results and noted any abnormalities with patient.  Discussed absolute need to recheck abnormalities with Cardiology    Discussed plan for discharge, as there is no emergent indication for admission.  Pt is agreeable and understands need for follow up and repeat testing.  Pt is aware that discharge does not mean that nothing is wrong but it indicates no emergency is present.  Pt is discharged with instructions to follow up with primary care doctor to have their blood pressure rechecked.       DIAGNOSIS  Final diagnoses:   Atypical chest pain   Palpitations       FOLLOW UP   [facility] CARDIOLOGY  [address]  [facility] [id number] [phone]    keep scheduled appointment          COURSE & MEDICAL DECISION MAKING  Pertinent Labs and Imaging studies that were ordered and reviewed are noted above.  Results were reviewed/discussed with the patient and they were also made  "aware of online assess.   Pt also made aware that some labs, such as cultures, will not be resulted during ER visit and follow up with PMD is necessary.     MEDICATIONS GIVEN IN ER  Medications - No data to display    BP (!) 143/102   Pulse 81   Temp 98.7 °F (37.1 °C) (Tympanic)   Resp 15   Ht 172.7 cm (68\")   Wt 81.6 kg (180 lb)   SpO2 96%   BMI 27.37 kg/m²       I personally reviewed the past medical history, past surgical history, social history, family history, current medications and allergies as they appear in this chart.  The scribe's note accurately reflects the work and decisions made by me.     Documentation assistance provided by beatriz Chinchilla for ILDEFONSO Landaverde on 1/23/2020 at 12:22 PM. Information recorded by the scribe was done at my direction and has been verified and validated by me.               Camille Chinchilla  01/23/20 1222       Camille Medina APRN  01/23/20 1443    "

## 2020-01-23 NOTE — ED NOTES
"Pt reports he has had intermittent CP and palpitations \"on and off for months.\" Pt reports he has been seen for this in this ER int he past. Pt reports he has not seen a cardiologist, and is visually anxious about his CP.        Marianela Amor, RN  01/23/20 8291    "

## 2020-01-23 NOTE — DISCHARGE INSTRUCTIONS
Continue current home medications  Keep scheduled appointment with Cardiology  Return to er for fever, chills, chest pain, shortness of air, or any new or worsening symptoms

## 2020-01-23 NOTE — ED PROVIDER NOTES
"MD ATTESTATION NOTE    Patient is a 24 y.o. male with hx of anxiety who presents to the ED with complaint of intermittent chest pain for the past eighteen months. The patient reports he has episodes of chest pain weekly. The patient reports the episodes of chest pain have become more frequent over the past several months. The patient reports he developed sharp chest pain and palpations (described as a \"flutter\" sensation\") yesterday, so he came to the ER for further evaluation. The patient reports he has an appointment with [facility] on 01/27/2020. The patient denies illicit substance use.    Physical Exam:  Constitutional: awake, alert, no acute distress  Cardiovascular: regular rhythm, regular rate  Pulmonary/Chest: normal effort, lungs are CTAB, mild tenderness to his chest wall  Abdominal: soft  Neurological: alert, moves all extremities, follows commands  Skin: warm, dry  Vital signs and nursing notes reviewed.    Procedures:  EKG          EKG time: 0732  Rhythm/Rate: sinus tachycardia, rate of 100  P waves and MT: nml; nml  QRS, axis: nml; nml   ST and T waves: nml; nml     Interpreted Contemporaneously by me, independently viewed.    Workup reviewed. Troponin is <0.010. D-dimer is negative. CBC and CMP are unremarkable. CXR shows no evidence of active disease within the chest. EKG is unremarkable. Plan is to discharge the patient home with instructions to f/u with his cardiologist as scheduled on Monday 01/27 for further evaluation and management.    The DOREEN and I have discussed this patient's history, physical exam, and treatment plan.  I have reviewed the documentation and personally had a face to face interaction with the patient. I affirm the documentation and agree with the treatment and plan.  The attached note describes my personal findings.    Documentation assistance provided by beatriz Gomez for Dr. Elias MD.  Information recorded by the beatriz was done at my direction and " has been verified and validated by me.     Medina Gomez  01/23/20 1201       Leo Griffin MD  01/23/20 8890

## 2020-01-27 ENCOUNTER — OFFICE VISIT (OUTPATIENT)
Dept: CARDIOLOGY | Facility: CLINIC | Age: 25
End: 2020-01-27

## 2020-01-27 VITALS
HEART RATE: 104 BPM | DIASTOLIC BLOOD PRESSURE: 108 MMHG | BODY MASS INDEX: 27.68 KG/M2 | OXYGEN SATURATION: 98 % | SYSTOLIC BLOOD PRESSURE: 164 MMHG | WEIGHT: 182.6 LBS | HEIGHT: 68 IN

## 2020-01-27 DIAGNOSIS — R07.89 OTHER CHEST PAIN: Primary | ICD-10-CM

## 2020-01-27 PROCEDURE — 99204 OFFICE O/P NEW MOD 45 MIN: CPT | Performed by: INTERNAL MEDICINE

## 2020-01-30 NOTE — PROGRESS NOTES
Subjective:     Encounter Date:01/27/2020      Patient ID: Jackson Fermin Jr. is a 24 y.o. male.    Chief Complaint:  Chest Pain    This is a recurrent problem. The current episode started more than 1 year ago. The problem has been waxing and waning. The pain is mild.       24-year-old gentleman who presents today for evaluation.  Patient has been having intermittent episodes of chest discomfort that dates back several years.  Said he had a strange tightness in his chest several years ago.  He was worked up his scope was negative he was actually placed on 80 mg of Nexium that actually helped things for a while but now they have subsequently worsened.  Patient says they occur on a daily basis.  The past 3 to 4 months he is also experienced some palpitations.  He said last Thursday for the first time he had episode of sharp chest discomfort.  He follows his blood pressure at home it usually runs about 1 17-1 35.  Patient used to dip very extensively but has been off it for the past 6 months.  For the past 3 months he has been using the e-cigarette instead.      Review of Systems   Cardiovascular: Positive for chest pain.   Psychiatric/Behavioral: Positive for depression. The patient is nervous/anxious.        Procedures       Objective:     Physical Exam   Constitutional: He is oriented to person, place, and time. He appears well-developed.   HENT:   Head: Normocephalic.   Eyes: Conjunctivae are normal.   Neck: Normal range of motion.   Cardiovascular: Normal rate, regular rhythm and normal heart sounds.   Pulmonary/Chest: Breath sounds normal.   Abdominal: Soft. Bowel sounds are normal.   Musculoskeletal: Normal range of motion. He exhibits no edema.   Neurological: He is alert and oriented to person, place, and time.   Skin: Skin is warm and dry.   Psychiatric: He has a normal mood and affect. His behavior is normal.   Vitals reviewed.      Lab Review:       Assessment:          Diagnosis Plan   1. Other chest pain   Adult Transthoracic Echo Complete W/ Cont if Necessary Per Protocol          Plan:       1.  Chest discomfort.  Very atypical features to it.  I am however a set him up for an echocardiogram to rule out structural heart disease make sure that there is not something that is not obvious by physical exam.  2.  Vaping obviously he is not utilizing the e-cigarette in a proper manner and stressed importance of getting off it altogether.  3.  Blood pressure markedly elevated today again is good continue to follow it also told to watch his salt intake.  4.  We will see what his echo shows see him back in several months and see what evolves.

## 2020-02-10 ENCOUNTER — APPOINTMENT (OUTPATIENT)
Dept: CARDIOLOGY | Facility: HOSPITAL | Age: 25
End: 2020-02-10

## 2020-02-11 ENCOUNTER — HOSPITAL ENCOUNTER (OUTPATIENT)
Dept: CARDIOLOGY | Facility: HOSPITAL | Age: 25
Discharge: HOME | End: 2020-02-11
Admitting: INTERNAL MEDICINE

## 2020-02-11 VITALS
WEIGHT: 182 LBS | HEIGHT: 68 IN | SYSTOLIC BLOOD PRESSURE: 142 MMHG | OXYGEN SATURATION: 99 % | DIASTOLIC BLOOD PRESSURE: 98 MMHG | BODY MASS INDEX: 27.58 KG/M2 | HEART RATE: 89 BPM

## 2020-02-11 DIAGNOSIS — R07.89 OTHER CHEST PAIN: ICD-10-CM

## 2020-02-11 LAB
AORTIC ARCH: 2.1 CM
AORTIC ROOT ANNULUS: 2.1 CM
ASCENDING AORTA: 2.5 CM
BH CV ECHO MEAS - ACS: 2.1 CM
BH CV ECHO MEAS - AO MAX PG (FULL): 1 MMHG
BH CV ECHO MEAS - AO MAX PG: 5.1 MMHG
BH CV ECHO MEAS - AO MEAN PG (FULL): 1.1 MMHG
BH CV ECHO MEAS - AO MEAN PG: 3.1 MMHG
BH CV ECHO MEAS - AO ROOT AREA (BSA CORRECTED): 1.4
BH CV ECHO MEAS - AO ROOT AREA: 5.8 CM^2
BH CV ECHO MEAS - AO ROOT DIAM: 2.7 CM
BH CV ECHO MEAS - AO V2 MAX: 112.5 CM/SEC
BH CV ECHO MEAS - AO V2 MEAN: 83.8 CM/SEC
BH CV ECHO MEAS - AO V2 VTI: 23.9 CM
BH CV ECHO MEAS - ASC AORTA: 2.5 CM
BH CV ECHO MEAS - AVA(I,A): 3 CM^2
BH CV ECHO MEAS - AVA(I,D): 3 CM^2
BH CV ECHO MEAS - AVA(V,A): 3.6 CM^2
BH CV ECHO MEAS - AVA(V,D): 3.6 CM^2
BH CV ECHO MEAS - BSA(HAYCOCK): 2 M^2
BH CV ECHO MEAS - BSA: 2 M^2
BH CV ECHO MEAS - BZI_BMI: 27.7 KILOGRAMS/M^2
BH CV ECHO MEAS - BZI_METRIC_HEIGHT: 172.7 CM
BH CV ECHO MEAS - BZI_METRIC_WEIGHT: 82.6 KG
BH CV ECHO MEAS - EDV(CUBED): 60.1 ML
BH CV ECHO MEAS - EDV(MOD-SP2): 88 ML
BH CV ECHO MEAS - EDV(MOD-SP4): 70 ML
BH CV ECHO MEAS - EDV(TEICH): 66.6 ML
BH CV ECHO MEAS - EF(CUBED): 75.3 %
BH CV ECHO MEAS - EF(MOD-BP): 57 %
BH CV ECHO MEAS - EF(MOD-SP2): 54.5 %
BH CV ECHO MEAS - EF(MOD-SP4): 57.1 %
BH CV ECHO MEAS - EF(TEICH): 67.9 %
BH CV ECHO MEAS - ESV(CUBED): 14.9 ML
BH CV ECHO MEAS - ESV(MOD-SP2): 40 ML
BH CV ECHO MEAS - ESV(MOD-SP4): 30 ML
BH CV ECHO MEAS - ESV(TEICH): 21.4 ML
BH CV ECHO MEAS - IVS/LVPW: 1.1
BH CV ECHO MEAS - IVSD: 1 CM
BH CV ECHO MEAS - LAT PEAK E' VEL: 16 CM/SEC
BH CV ECHO MEAS - LV DIASTOLIC VOL/BSA (35-75): 35.6 ML/M^2
BH CV ECHO MEAS - LV MASS(C)D: 112.9 GRAMS
BH CV ECHO MEAS - LV MASS(C)DI: 57.5 GRAMS/M^2
BH CV ECHO MEAS - LV MAX PG: 4 MMHG
BH CV ECHO MEAS - LV MEAN PG: 2 MMHG
BH CV ECHO MEAS - LV SYSTOLIC VOL/BSA (12-30): 15.3 ML/M^2
BH CV ECHO MEAS - LV V1 MAX: 100.3 CM/SEC
BH CV ECHO MEAS - LV V1 MEAN: 66.3 CM/SEC
BH CV ECHO MEAS - LV V1 VTI: 17.7 CM
BH CV ECHO MEAS - LVIDD: 3.9 CM
BH CV ECHO MEAS - LVIDS: 2.5 CM
BH CV ECHO MEAS - LVLD AP2: 8 CM
BH CV ECHO MEAS - LVLD AP4: 7.1 CM
BH CV ECHO MEAS - LVLS AP2: 7.2 CM
BH CV ECHO MEAS - LVLS AP4: 6.4 CM
BH CV ECHO MEAS - LVOT AREA (M): 4.2 CM^2
BH CV ECHO MEAS - LVOT AREA: 4 CM^2
BH CV ECHO MEAS - LVOT DIAM: 2.3 CM
BH CV ECHO MEAS - LVPWD: 0.88 CM
BH CV ECHO MEAS - MED PEAK E' VEL: 12 CM/SEC
BH CV ECHO MEAS - MV A DUR: 0.12 SEC
BH CV ECHO MEAS - MV A MAX VEL: 63 CM/SEC
BH CV ECHO MEAS - MV DEC SLOPE: 622.9 CM/SEC^2
BH CV ECHO MEAS - MV DEC TIME: 0.15 SEC
BH CV ECHO MEAS - MV E MAX VEL: 92.1 CM/SEC
BH CV ECHO MEAS - MV E/A: 1.5
BH CV ECHO MEAS - MV MAX PG: 4 MMHG
BH CV ECHO MEAS - MV MEAN PG: 1.7 MMHG
BH CV ECHO MEAS - MV P1/2T MAX VEL: 93.4 CM/SEC
BH CV ECHO MEAS - MV P1/2T: 43.9 MSEC
BH CV ECHO MEAS - MV V2 MAX: 100.4 CM/SEC
BH CV ECHO MEAS - MV V2 MEAN: 59.8 CM/SEC
BH CV ECHO MEAS - MV V2 VTI: 21 CM
BH CV ECHO MEAS - MVA P1/2T LCG: 2.4 CM^2
BH CV ECHO MEAS - MVA(P1/2T): 5 CM^2
BH CV ECHO MEAS - MVA(VTI): 3.4 CM^2
BH CV ECHO MEAS - PA ACC TIME: 0.12 SEC
BH CV ECHO MEAS - PA MAX PG (FULL): 4.6 MMHG
BH CV ECHO MEAS - PA MAX PG: 6.4 MMHG
BH CV ECHO MEAS - PA PR(ACCEL): 26.7 MMHG
BH CV ECHO MEAS - PA V2 MAX: 126.2 CM/SEC
BH CV ECHO MEAS - PULM A REVS DUR: 0.09 SEC
BH CV ECHO MEAS - PULM A REVS VEL: 25.3 CM/SEC
BH CV ECHO MEAS - PULM DIAS VEL: 39 CM/SEC
BH CV ECHO MEAS - PULM S/D: 0.97
BH CV ECHO MEAS - PULM SYS VEL: 37.7 CM/SEC
BH CV ECHO MEAS - PVA(V,A): 1.7 CM^2
BH CV ECHO MEAS - PVA(V,D): 1.7 CM^2
BH CV ECHO MEAS - QP/QS: 0.6
BH CV ECHO MEAS - RAP SYSTOLE: 3 MMHG
BH CV ECHO MEAS - RV MAX PG: 1.8 MMHG
BH CV ECHO MEAS - RV MEAN PG: 1.1 MMHG
BH CV ECHO MEAS - RV V1 MAX: 66.4 CM/SEC
BH CV ECHO MEAS - RV V1 MEAN: 49.4 CM/SEC
BH CV ECHO MEAS - RV V1 VTI: 12.9 CM
BH CV ECHO MEAS - RVOT AREA: 3.3 CM^2
BH CV ECHO MEAS - RVOT DIAM: 2.1 CM
BH CV ECHO MEAS - SI(AO): 71.1 ML/M^2
BH CV ECHO MEAS - SI(CUBED): 23.1 ML/M^2
BH CV ECHO MEAS - SI(LVOT): 36.1 ML/M^2
BH CV ECHO MEAS - SI(MOD-SP2): 24.4 ML/M^2
BH CV ECHO MEAS - SI(MOD-SP4): 20.4 ML/M^2
BH CV ECHO MEAS - SI(TEICH): 23 ML/M^2
BH CV ECHO MEAS - SUP REN AO DIAM: 1.6 CM
BH CV ECHO MEAS - SV(AO): 139.6 ML
BH CV ECHO MEAS - SV(CUBED): 45.3 ML
BH CV ECHO MEAS - SV(LVOT): 70.9 ML
BH CV ECHO MEAS - SV(MOD-SP2): 48 ML
BH CV ECHO MEAS - SV(MOD-SP4): 40 ML
BH CV ECHO MEAS - SV(RVOT): 42.7 ML
BH CV ECHO MEAS - SV(TEICH): 45.2 ML
BH CV ECHO MEAS - TAPSE (>1.6): 2.1 CM2
BH CV ECHO MEASUREMENTS AVERAGE E/E' RATIO: 6.58
BH CV XLRA - RV BASE: 2.6 CM
BH CV XLRA - RV LENGTH: 5.6 CM
BH CV XLRA - RV MID: 1.5 CM
BH CV XLRA - TDI S': 14 CM/SEC
LEFT ATRIUM VOLUME INDEX: 18 ML/M2
SINUS: 2.5 CM
STJ: 2.4 CM

## 2020-02-11 PROCEDURE — 93306 TTE W/DOPPLER COMPLETE: CPT

## 2020-02-11 PROCEDURE — 93306 TTE W/DOPPLER COMPLETE: CPT | Performed by: INTERNAL MEDICINE

## 2020-05-12 ENCOUNTER — OFFICE VISIT (OUTPATIENT)
Dept: GASTROENTEROLOGY | Facility: CLINIC | Age: 25
End: 2020-05-12

## 2020-05-12 ENCOUNTER — PREP FOR SURGERY (OUTPATIENT)
Dept: OTHER | Facility: HOSPITAL | Age: 25
End: 2020-05-12

## 2020-05-12 VITALS
BODY MASS INDEX: 29.34 KG/M2 | OXYGEN SATURATION: 98 % | TEMPERATURE: 97.7 F | SYSTOLIC BLOOD PRESSURE: 130 MMHG | HEIGHT: 68 IN | HEART RATE: 96 BPM | WEIGHT: 193.6 LBS | DIASTOLIC BLOOD PRESSURE: 88 MMHG

## 2020-05-12 DIAGNOSIS — K20.90 ESOPHAGITIS: ICD-10-CM

## 2020-05-12 DIAGNOSIS — K92.0 HEMATEMESIS, PRESENCE OF NAUSEA NOT SPECIFIED: ICD-10-CM

## 2020-05-12 DIAGNOSIS — K21.00 GASTROESOPHAGEAL REFLUX DISEASE WITH ESOPHAGITIS: Primary | ICD-10-CM

## 2020-05-12 DIAGNOSIS — K29.00 ACUTE GASTRITIS, PRESENCE OF BLEEDING UNSPECIFIED, UNSPECIFIED GASTRITIS TYPE: ICD-10-CM

## 2020-05-12 PROCEDURE — 99204 OFFICE O/P NEW MOD 45 MIN: CPT | Performed by: INTERNAL MEDICINE

## 2020-05-12 RX ORDER — CHLORDIAZEPOXIDE HYDROCHLORIDE 25 MG/1
CAPSULE, GELATIN COATED ORAL
COMMUNITY
Start: 2020-04-30 | End: 2022-04-04

## 2020-05-12 RX ORDER — PANTOPRAZOLE SODIUM 40 MG/1
40 TABLET, DELAYED RELEASE ORAL DAILY
Qty: 30 TABLET | Refills: 5 | Status: SHIPPED | OUTPATIENT
Start: 2020-05-12 | End: 2020-05-14 | Stop reason: SDUPTHER

## 2020-05-12 NOTE — PROGRESS NOTES
GI Bleeding and Heartburn (acid reflux )      HPI  Patient here for consultation.  He has a history of severe reflux disease.  He had an upper endoscopy about 18 months ago which showed gastritis and esophagitis.  He was treated initially with pantoprazole which seemed to help quite a bit but then he was switched to Prilosec.  Since then, his symptoms have been significantly worsening.  About 2 to 3 weeks ago he developed severe symptoms with retching and hematemesis for which he went to the emergency room.  Since then, he still had persistent issues with the heartburn.  He describes a burning sensation in the upper abdomen into the chest.  No associated dysphagia.  Weight is stable.  He still drinks some alcohol but not as much as before.  He denies any melena or hematochezia.  He has had no change in bowel habits.  He denies any lower abdominal pain.    Review of Systems   Constitutional: Negative for appetite change, chills, diaphoresis, fatigue, fever and unexpected weight change.   HENT: Negative for dental problem, ear pain, mouth sores, rhinorrhea, sore throat and voice change.    Eyes: Negative for pain, redness and visual disturbance.   Respiratory: Negative for cough, chest tightness and wheezing.    Cardiovascular: Negative for chest pain, palpitations and leg swelling.   Endocrine: Negative for cold intolerance, heat intolerance, polydipsia, polyphagia and polyuria.   Genitourinary: Negative for dysuria, frequency, hematuria and urgency.   Musculoskeletal: Negative for arthralgias, back pain, joint swelling, myalgias and neck pain.   Skin: Negative for rash.   Allergic/Immunologic: Negative for environmental allergies, food allergies and immunocompromised state.   Neurological: Negative for dizziness, seizures, weakness, numbness and headaches.   Hematological: Does not bruise/bleed easily.   Psychiatric/Behavioral: Negative for sleep disturbance. The patient is not nervous/anxious.         I have  reviewed and confirmed the accuracy of the HPI and ROS as documented by the APRN Sidney Simeon MD     Problem List:  There is no problem list on file for this patient.      Medical History:    Past Medical History:   Diagnosis Date   • Anxiety    • Depression    • GERD (gastroesophageal reflux disease)    • GI (gastrointestinal bleed)         Social History:    Social History     Socioeconomic History   • Marital status: Single     Spouse name: Not on file   • Number of children: Not on file   • Years of education: Not on file   • Highest education level: Not on file   Tobacco Use   • Smoking status: Never Smoker   • Smokeless tobacco: Current User     Types: Chew   • Tobacco comment: Uses E-cigarettes   no caffeine   Substance and Sexual Activity   • Alcohol use: Not Currently     Frequency: Never     Comment: couple times a week   • Drug use: No   • Sexual activity: Defer       Family History:   Family History   Problem Relation Age of Onset   • Hypertension Father    • Cancer Father    • Cancer Maternal Grandmother    • Cancer Maternal Grandfather    • Colon cancer Maternal Grandfather    • Cancer Paternal Grandmother    • Diabetes Paternal Grandfather        Surgical History:   Past Surgical History:   Procedure Laterality Date   • UPPER GASTROINTESTINAL ENDOSCOPY           Current Outpatient Medications:   •  chlordiazePOXIDE (LIBRIUM) 25 MG capsule, , Disp: , Rfl:   •  pantoprazole (PROTONIX) 40 MG EC tablet, Take 1 tablet by mouth Daily., Disp: 30 tablet, Rfl: 5  •  vilazodone (VIIBRYD) 20 MG tablet tablet, Take 1 tablet by mouth Daily., Disp: , Rfl:   •  aspirin 81 MG chewable tablet, Chew 81 mg As Needed., Disp: , Rfl:     Allergies: No Known Allergies     The following portions of the patient's history were reviewed and updated as appropriate: allergies, current medications, past family history, past medical history, past social history, past surgical history and problem list.    Vitals:    05/12/20  1047   BP: 130/88   Pulse: 96   Temp: 97.7 °F (36.5 °C)   SpO2: 98%         05/12/20  1047   Weight: 87.8 kg (193 lb 9.6 oz)     Body mass index is 29.44 kg/m².      PHYSICAL EXAM:  Physical Exam   Constitutional: He appears well-developed.   HENT:   Nose: Nose normal. No nasal deformity.   Eyes: No scleral icterus.   Neck: No tracheal deviation present.   Pulmonary/Chest: Effort normal and breath sounds normal. No respiratory distress.   Abdominal: Soft. Normal appearance and bowel sounds are normal. He exhibits no shifting dullness and no distension. There is no hepatosplenomegaly. There is no tenderness. There is no rigidity, no rebound and no guarding. No hernia.   Lymphadenopathy:   No periumbilical lymphadenopathy   Neurological: He is alert.   Skin: Skin is warm. No cyanosis.   Psychiatric: He has a normal mood and affect. His behavior is normal.   Vitals reviewed.          Assessment/ Plan  Jackson was seen today for gi bleeding and heartburn.    Diagnoses and all orders for this visit:    Gastroesophageal reflux disease with esophagitis    Esophagitis    Acute gastritis, presence of bleeding unspecified, unspecified gastritis type    Hematemesis, presence of nausea not specified    Other orders  -     pantoprazole (PROTONIX) 40 MG EC tablet; Take 1 tablet by mouth Daily.         Return for after endoscopy.      Discussion:  Patient has had a new problem of hematemesis.  Unclear if this was poss related to severe esophagitis versus esophageal ulcer versus Mindy-Luong tear.  He is also had worsening reflux.  This is despite Prilosec and antireflux measures.  We will proceed with an upper endoscopy for further assessment.  I am going to go ahead and switch him back to pantoprazole from Prilosec.  We will make further recommendations pending endoscopy and response to pantoprazole.  He also has a history of gastritis so advised him to avoid nonsteroidals.  Apparently his H. pylori status is negative.

## 2020-05-14 ENCOUNTER — TELEPHONE (OUTPATIENT)
Dept: GASTROENTEROLOGY | Facility: CLINIC | Age: 25
End: 2020-05-14

## 2020-05-14 RX ORDER — PANTOPRAZOLE SODIUM 40 MG/1
40 TABLET, DELAYED RELEASE ORAL DAILY
Qty: 30 TABLET | Refills: 5 | Status: SHIPPED | OUTPATIENT
Start: 2020-05-14 | End: 2022-04-04

## 2022-04-04 ENCOUNTER — OFFICE VISIT (OUTPATIENT)
Dept: CARDIOLOGY | Facility: CLINIC | Age: 27
End: 2022-04-04

## 2022-04-04 VITALS
DIASTOLIC BLOOD PRESSURE: 70 MMHG | WEIGHT: 181 LBS | HEIGHT: 69 IN | HEART RATE: 84 BPM | SYSTOLIC BLOOD PRESSURE: 130 MMHG | BODY MASS INDEX: 26.81 KG/M2

## 2022-04-04 DIAGNOSIS — R00.2 PALPITATIONS: Primary | ICD-10-CM

## 2022-04-04 DIAGNOSIS — R07.89 CHEST PAIN, ATYPICAL: ICD-10-CM

## 2022-04-04 DIAGNOSIS — I10 ESSENTIAL HYPERTENSION: ICD-10-CM

## 2022-04-04 DIAGNOSIS — K21.9 GASTROESOPHAGEAL REFLUX DISEASE WITHOUT ESOPHAGITIS: ICD-10-CM

## 2022-04-04 PROBLEM — R07.9 CHEST PAIN DUE TO GERD: Status: ACTIVE | Noted: 2022-04-04

## 2022-04-04 PROCEDURE — 93000 ELECTROCARDIOGRAM COMPLETE: CPT | Performed by: NURSE PRACTITIONER

## 2022-04-04 PROCEDURE — 99214 OFFICE O/P EST MOD 30 MIN: CPT | Performed by: NURSE PRACTITIONER

## 2022-04-04 RX ORDER — ESOMEPRAZOLE MAGNESIUM 40 MG/1
40 CAPSULE, DELAYED RELEASE ORAL AS NEEDED
COMMUNITY

## 2022-04-04 RX ORDER — BUPROPION HYDROCHLORIDE 150 MG/1
150 TABLET, EXTENDED RELEASE ORAL 2 TIMES DAILY
COMMUNITY
Start: 2022-02-01 | End: 2022-08-03

## 2022-04-04 NOTE — PROGRESS NOTES
"[facility] Follow Up Office Note     Encounter Date:22  Patient:Jackson Fermin Jr.  :1995  MRN:[id number]      Chief Complaint: No chief complaint on file.        History of Presenting Illness:      Mr. Goodwin is a 26 y.o. male who is seen today for follow-up.  He is a patient of Dr. Martínez.    Patient has past medical history that is significant for intermittent atypical chest pain.  He had a GI work-up, endoscopy did not show anything and he was started on PPI which helped for short period of time.  He has a history of using smokeless tobacco but has stopped and uses e-cigarettes instead.    He was last seen by Dr. Martínez in 2020 and at this time an echocardiogram was ordered.  BP was elevated at time of visit and he was told to monitor his sodium intake.  Echocardiogram demonstrated LVEF of 57% with no regional wall motion abnormality or diastolic dysfunction and no valvular abnormalities.  In May 2020 he was seen by GI again for retching and hematemesis.  He was changed from Prilosec to pantoprazole.  Upper endoscopy was recommended.    Today patient reports he is experiencing \"skipped beats\" associated with dizziness and lightheadedness. He also has sharp chest pains that are short in duration.  He does work-out 5x a week, including cardio and lifting weights.  Walks 15 min daily.  Never has symptoms during work-outs.  He reports history of amphetamine/ stimulant abuse x 8 months from May 2020 to 2021 but has been clean the past year.  He uses vape/ e-cigarette socially and reports 25 drinks a week.  He checks his BP multiple times a day and it ranges 120-130/70-80 mmHg.    Review of Systems:  Review of Systems   Cardiovascular: Positive for chest pain, irregular heartbeat, near-syncope and palpitations. Negative for dyspnea on exertion, leg swelling and orthopnea.   Respiratory: Negative for shortness of breath.    Neurological: Positive for dizziness.       Current " Outpatient Medications on File Prior to Visit   Medication Sig Dispense Refill   • buPROPion SR (WELLBUTRIN SR) 150 MG 12 hr tablet Take 150 mg by mouth 2 (Two) Times a Day.     • esomeprazole (nexIUM) 40 MG capsule Take 40 mg by mouth Every Morning Before Breakfast.     • [DISCONTINUED] aspirin 81 MG chewable tablet Chew 81 mg As Needed.     • [DISCONTINUED] chlordiazePOXIDE (LIBRIUM) 25 MG capsule      • [DISCONTINUED] pantoprazole (PROTONIX) 40 MG EC tablet Take 1 tablet by mouth Daily. 30 tablet 5   • [DISCONTINUED] vilazodone (VIIBRYD) 20 MG tablet tablet Take 1 tablet by mouth Daily.       No current facility-administered medications on file prior to visit.       No Known Allergies    Past Medical History:   Diagnosis Date   • Anxiety    • Depression    • GERD (gastroesophageal reflux disease)    • GI (gastrointestinal bleed)        Past Surgical History:   Procedure Laterality Date   • UPPER GASTROINTESTINAL ENDOSCOPY         Social History     Socioeconomic History   • Marital status: Single   Tobacco Use   • Smoking status: Never Smoker   • Smokeless tobacco: Former User     Types: Chew   • Tobacco comment: Uses E-cigarettes   no caffeine   Substance and Sexual Activity   • Alcohol use: Not Currently     Comment: couple times a week   • Drug use: No   • Sexual activity: Defer       Family History   Problem Relation Age of Onset   • Hypertension Father    • Cancer Father    • Cancer Maternal Grandmother    • Cancer Maternal Grandfather    • Colon cancer Maternal Grandfather    • Cancer Paternal Grandmother    • Diabetes Paternal Grandfather        The following portions of the patient's history were reviewed and updated as appropriate: allergies, current medications, past family history, past medical history, past social history, past surgical history and problem list.       Objective:       Vitals:    04/04/22 1105   BP: 130/70   BP Location: Left arm   Pulse: 84   Weight: 82.1 kg (181 lb)   Height: 175.3  "cm (69\")         Physical Exam:  Constitutional: Well appearing, well developed, no acute distress   HENT: Oropharynx clear and membrane moist  Eyes: Normal conjunctiva, no sclera icterus  Neck: Supple    Cardiovascular: Regular rate and rhythm, No Murmur, No bilateral lower extremity edema  Pulmonary: Normal respiratory effort, normal lung sounds, no wheezing  Neurological: Alert and orient x 3  Skin: Warm, dry, no ecchymosis, no rash  Psych: Appropriate mood and affect. Normal judgment and insight         Lab Results   Component Value Date     04/30/2020     04/09/2020    K 4.2 04/30/2020    K 4.3 04/09/2020    CL 99 04/30/2020    CL 99 04/09/2020    CO2 25 04/30/2020    CO2 25 04/09/2020    BUN 16 04/30/2020    BUN 14 04/09/2020    CREATININE 1.2 04/30/2020    CREATININE 1.1 04/09/2020    EGFRIFNONA 84 01/23/2020    EGFRIFNONA 68 07/13/2019    GLUCOSE 108 (H) 01/23/2020    GLUCOSE 132 (H) 07/13/2019    CALCIUM 9.9 04/30/2020    CALCIUM 9.5 04/09/2020    ALBUMIN 5.0 04/30/2020    ALBUMIN 5.3 (H) 04/09/2020    BILITOT 1.1 04/30/2020    BILITOT 0.8 04/09/2020    AST 79 (H) 04/30/2020    AST 87 (H) 04/09/2020     (H) 04/30/2020     (H) 04/09/2020     Lab Results   Component Value Date    WBC 5.32 04/30/2020    WBC 7.48 04/09/2020    HGB 16.6 04/30/2020    HGB 17.9 (H) 04/09/2020    HCT 49.4 04/30/2020    HCT 49.6 04/09/2020    MCV 96.9 04/30/2020    MCV 91.2 04/09/2020     04/30/2020     04/09/2020     No results found for: CHOL, TRIG, HDL, LDL  No results found for: PROBNP, BNP  Lab Results   Component Value Date    TROPONINT <0.010 01/23/2020     Lab Results   Component Value Date    TSH 1.450 07/13/2019           ECG 12 Lead    Date/Time: 4/4/2022 12:59 PM  Performed by: Alicia Madrigal APRN  Authorized by: Alicia Madrigal APRN   Comparison: compared with previous ECG from 1/23/2020  Rhythm: sinus rhythm  Rate: normal  QRS axis: normal    Clinical impression: normal " ECG             Assessment:          Diagnosis Plan   1. Palpitations  Holter Monitor - 72 Hour Up To 15 Days    Adult Transthoracic Echo Complete w/ Color, Spectral and Contrast if Necessary Per Protocol   2. Chest pain, atypical  ECG 12 Lead   3. Essential hypertension     4. Gastroesophageal reflux disease without esophagitis            Plan:       Palpitations:  - chief complaint. Reports skipped beats which is increasing in frequency that are associated with lightheadedness  - Zio monitor x 2 weeks  - recheck echocardiogram  - educated patient regarding correlation of ectopic beats and drinking    Atypical chest pain:  - these are typically short in duration (several seconds) and sharp  - does not have any symptoms such as chest pain/ pressure or dyspnea with activity including walking, running and lifting weights  - this does not seem cardiac in nature    Hypertension:  - controlled on today's visit    GERD:  - on PPI    Patient is having frequent skipped beats and palpitations which make him feel lightheaded and anxious.  He has history of stimulant abuse and is very concerned he has heart damage.  I think since these symptoms are new it is a good idea to recheck echocardiogram for any structural issues and for him to wear Zio patch for 2 weeks so we can monitor for any arrhythmias.  I will call him with the results of these tests and I think it is a good idea for him to see Dr. Martínez again in 6 months.    Orders Placed This Encounter   Procedures   • Holter Monitor - 72 Hour Up To 15 Days     Standing Status:   Future     Number of Occurrences:   1     Standing Expiration Date:   4/4/2023     Order Specific Question:   Reason for exam?     Answer:   Palpitations     Order Specific Question:   Release to patient     Answer:   Immediate   • ECG 12 Lead     This order was created via procedure documentation     Order Specific Question:   Release to patient     Answer:   Immediate   • Adult Transthoracic Echo  Complete w/ Color, Spectral and Contrast if Necessary Per Protocol     Standing Status:   Future     Standing Expiration Date:   4/4/2023     Order Specific Question:   Reason for exam?     Answer:   Palpitations     Order Specific Question:   Release to patient     Answer:   Immediate        RANDY Cardona  [facility]  04/04/22  13:06 EDT

## 2022-04-13 ENCOUNTER — TELEPHONE (OUTPATIENT)
Dept: CARDIOLOGY | Facility: CLINIC | Age: 27
End: 2022-04-13

## 2022-04-13 NOTE — TELEPHONE ENCOUNTER
Patient calling for results of holter monitor.    Please call him at [phone].    Scheduling, please call patient to schedule echo.    Thanks!

## 2022-04-14 NOTE — TELEPHONE ENCOUNTER
Pt left a VM wanting to talk to CPS.    I called the pt back and he states he would like some additional information regarding the test results, does any additional testing need to be done, and what the plan for the future looks like. I.e doc appts, etc.

## 2022-04-14 NOTE — TELEPHONE ENCOUNTER
I called patient and discussed his results.  He is to have echocardiogram scheduled.  I will reach out to scheduling to call him on his work cell as his personal call is not working.

## 2022-04-28 ENCOUNTER — HOSPITAL ENCOUNTER (OUTPATIENT)
Dept: CARDIOLOGY | Facility: HOSPITAL | Age: 27
Discharge: HOME | End: 2022-04-28
Admitting: NURSE PRACTITIONER

## 2022-04-28 ENCOUNTER — TELEPHONE (OUTPATIENT)
Dept: CARDIOLOGY | Facility: CLINIC | Age: 27
End: 2022-04-28

## 2022-04-28 VITALS — BODY MASS INDEX: 26.81 KG/M2 | WEIGHT: 181 LBS | HEIGHT: 69 IN

## 2022-04-28 DIAGNOSIS — R00.2 PALPITATIONS: ICD-10-CM

## 2022-04-28 LAB
ASCENDING AORTA: 2.7 CM
BH CV ECHO MEAS - ACS: 1.97 CM
BH CV ECHO MEAS - AO MAX PG: 4.7 MMHG
BH CV ECHO MEAS - AO MEAN PG: 3.4 MMHG
BH CV ECHO MEAS - AO ROOT DIAM: 3 CM
BH CV ECHO MEAS - AO V2 MAX: 108.6 CM/SEC
BH CV ECHO MEAS - AO V2 VTI: 23.7 CM
BH CV ECHO MEAS - AVA(I,D): 1.9 CM2
BH CV ECHO MEAS - EDV(CUBED): 77.3 ML
BH CV ECHO MEAS - EDV(MOD-SP2): 65 ML
BH CV ECHO MEAS - EDV(MOD-SP4): 70 ML
BH CV ECHO MEAS - EF(MOD-BP): 63.1 %
BH CV ECHO MEAS - EF(MOD-SP2): 61.5 %
BH CV ECHO MEAS - EF(MOD-SP4): 67.1 %
BH CV ECHO MEAS - ESV(CUBED): 29.4 ML
BH CV ECHO MEAS - ESV(MOD-SP2): 25 ML
BH CV ECHO MEAS - ESV(MOD-SP4): 23 ML
BH CV ECHO MEAS - FS: 27.5 %
BH CV ECHO MEAS - IVS/LVPW: 0.94 CM
BH CV ECHO MEAS - IVSD: 0.77 CM
BH CV ECHO MEAS - LAT PEAK E' VEL: 16 CM/SEC
BH CV ECHO MEAS - LV DIASTOLIC VOL/BSA (35-75): 35.4 CM2
BH CV ECHO MEAS - LV MASS(C)D: 102.1 GRAMS
BH CV ECHO MEAS - LV MAX PG: 2.7 MMHG
BH CV ECHO MEAS - LV MEAN PG: 1.4 MMHG
BH CV ECHO MEAS - LV SYSTOLIC VOL/BSA (12-30): 11.6 CM2
BH CV ECHO MEAS - LV V1 MAX: 82.5 CM/SEC
BH CV ECHO MEAS - LV V1 VTI: 14.3 CM
BH CV ECHO MEAS - LVIDD: 4.3 CM
BH CV ECHO MEAS - LVIDS: 3.1 CM
BH CV ECHO MEAS - LVOT AREA: 3.1 CM2
BH CV ECHO MEAS - LVOT DIAM: 2 CM
BH CV ECHO MEAS - LVPWD: 0.82 CM
BH CV ECHO MEAS - MED PEAK E' VEL: 10.8 CM/SEC
BH CV ECHO MEAS - MR MAX PG: 69 MMHG
BH CV ECHO MEAS - MR MAX VEL: 415.3 CM/SEC
BH CV ECHO MEAS - MV A DUR: 0.13 SEC
BH CV ECHO MEAS - MV A MAX VEL: 52.9 CM/SEC
BH CV ECHO MEAS - MV DEC SLOPE: 553.9 CM/SEC2
BH CV ECHO MEAS - MV DEC TIME: 0.13 MSEC
BH CV ECHO MEAS - MV E MAX VEL: 75.2 CM/SEC
BH CV ECHO MEAS - MV E/A: 1.42
BH CV ECHO MEAS - MV MAX PG: 2.8 MMHG
BH CV ECHO MEAS - MV MEAN PG: 1.59 MMHG
BH CV ECHO MEAS - MV V2 VTI: 21.2 CM
BH CV ECHO MEAS - MVA(VTI): 2.12 CM2
BH CV ECHO MEAS - PA ACC TIME: 0.11 SEC
BH CV ECHO MEAS - PA PR(ACCEL): 28.3 MMHG
BH CV ECHO MEAS - PA V2 MAX: 99.1 CM/SEC
BH CV ECHO MEAS - PULM A REVS DUR: 0.1 SEC
BH CV ECHO MEAS - PULM A REVS VEL: 26.2 CM/SEC
BH CV ECHO MEAS - PULM DIAS VEL: 35.2 CM/SEC
BH CV ECHO MEAS - PULM SYS VEL: 36.2 CM/SEC
BH CV ECHO MEAS - RV MAX PG: 1.27 MMHG
BH CV ECHO MEAS - RV V1 MAX: 56.3 CM/SEC
BH CV ECHO MEAS - RV V1 VTI: 10.9 CM
BH CV ECHO MEAS - RVOT DIAM: 1.99 CM
BH CV ECHO MEAS - SI(MOD-SP2): 20.2 ML/M2
BH CV ECHO MEAS - SI(MOD-SP4): 23.7 ML/M2
BH CV ECHO MEAS - SV(LVOT): 45 ML
BH CV ECHO MEAS - SV(MOD-SP2): 40 ML
BH CV ECHO MEAS - SV(MOD-SP4): 47 ML
BH CV ECHO MEAS - SV(RVOT): 33.9 ML
BH CV ECHO MEAS - TAPSE (>1.6): 1.81 CM
BH CV ECHO MEAS - TR MAX PG: 16.6 MMHG
BH CV ECHO MEAS - TR MAX VEL: 203.8 CM/SEC
BH CV ECHO MEASUREMENTS AVERAGE E/E' RATIO: 5.61
BH CV XLRA - RV BASE: 3.2 CM
BH CV XLRA - RV LENGTH: 6.2 CM
BH CV XLRA - RV MID: 2.6 CM
BH CV XLRA - TDI S': 7.1 CM/SEC
LEFT ATRIUM VOLUME INDEX: 12.1 ML/M2
MAXIMAL PREDICTED HEART RATE: 193 BPM
SINUS: 2.5 CM
STJ: 2.15 CM
STRESS TARGET HR: 164 BPM

## 2022-04-28 PROCEDURE — 93306 TTE W/DOPPLER COMPLETE: CPT

## 2022-04-28 PROCEDURE — 93306 TTE W/DOPPLER COMPLETE: CPT | Performed by: INTERNAL MEDICINE

## 2022-04-28 NOTE — TELEPHONE ENCOUNTER
----- Message from RANDY Howard sent at 4/28/2022 12:02 PM EDT -----  Can you please call patient and let him know his echocardiogram is normal.  The ejection fraction or pumping action of the heart is normal and he has no significant valve abnormalities.

## 2022-04-28 NOTE — PROGRESS NOTES
Can you please call patient and let him know his echocardiogram is normal.  The ejection fraction or pumping action of the heart is normal and he has no significant valve abnormalities.

## 2022-04-28 NOTE — TELEPHONE ENCOUNTER
Called and left VM. Will continue to try to reach patient.     Becki Villanueva RN  Triage [facility] [place]

## 2022-04-28 NOTE — TELEPHONE ENCOUNTER
Notified patient of results. Patient verbalized understanding.    Becki Villanueva RN  Triage [facility] [place]

## 2022-10-10 ENCOUNTER — OFFICE VISIT (OUTPATIENT)
Dept: CARDIOLOGY | Facility: CLINIC | Age: 27
End: 2022-10-10

## 2022-10-10 VITALS
RESPIRATION RATE: 18 BRPM | HEART RATE: 77 BPM | OXYGEN SATURATION: 97 % | HEIGHT: 69 IN | WEIGHT: 177 LBS | BODY MASS INDEX: 26.22 KG/M2 | SYSTOLIC BLOOD PRESSURE: 132 MMHG | DIASTOLIC BLOOD PRESSURE: 78 MMHG

## 2022-10-10 DIAGNOSIS — I10 ESSENTIAL HYPERTENSION: Primary | ICD-10-CM

## 2022-10-10 PROCEDURE — 99213 OFFICE O/P EST LOW 20 MIN: CPT | Performed by: INTERNAL MEDICINE

## 2022-10-10 RX ORDER — BUPROPION HYDROCHLORIDE 300 MG/1
300 TABLET ORAL DAILY
COMMUNITY
Start: 2022-08-30 | End: 2022-10-10 | Stop reason: SDUPTHER

## 2022-10-10 RX ORDER — ATOMOXETINE 40 MG/1
CAPSULE ORAL
COMMUNITY
Start: 2022-09-27 | End: 2022-10-10 | Stop reason: SDUPTHER

## 2022-10-10 NOTE — PROGRESS NOTES
"      CARDIOLOGY    Seth Martínez MD    ENCOUNTER DATE:  10/10/2022    Jackson Fermin Jr. / 27 y.o. / male        CHIEF COMPLAINT / REASON FOR OFFICE VISIT     Heart Problem (6 Month follow up )      HISTORY OF PRESENT ILLNESS       HPI  Jackson Fermin Jr. is a 27 y.o. male who presents today for reevaluation.  Patient has a history of hypertension as well some palpitations.  He says from time to time he can feel his heart skip but nothing concerning or new.  Overall he is doing great.  Blood pressures been good.      The following portions of the patient's history were reviewed and updated as appropriate: allergies, current medications, past family history, past medical history, past social history, past surgical history and problem list.      VITAL SIGNS     Visit Vitals  /78 (BP Location: Left arm, Patient Position: Sitting, Cuff Size: Adult)   Pulse 77   Resp 18   Ht 175.3 cm (69\")   Wt 80.3 kg (177 lb)   SpO2 97%   BMI 26.14 kg/m²         Wt Readings from Last 3 Encounters:   10/10/22 80.3 kg (177 lb)   08/03/22 77.1 kg (170 lb)   04/28/22 82.1 kg (181 lb)     Body mass index is 26.14 kg/m².      REVIEW OF SYSTEMS   ROS        PHYSICAL EXAMINATION     Vitals reviewed.   Constitutional:       Appearance: Healthy appearance.   Pulmonary:      Effort: Pulmonary effort is normal.   Cardiovascular:      Normal rate. Regular rhythm. Normal S1. Normal S2.      Murmurs: There is no murmur.      No gallop. No click. No rub.   Pulses:     Intact distal pulses.   Edema:     Peripheral edema absent.   Neurological:      Mental Status: Alert.           REVIEWED DATA     Procedures    Cardiac Procedures:  1.           ASSESSMENT & PLAN      Diagnosis Plan   1. Essential hypertension              SUMMARY/DISCUSSION  1. Hypertension/palpitations.  Overall things have been stable and he is doing well.  At this point I think he can follow-up with his primary care provider and I can see him on an as-needed basis unless he " has issues concerns or questions.        MEDICATIONS         Discharge Medications          Accurate as of October 10, 2022 10:27 AM. If you have any questions, ask your nurse or doctor.            Continue These Medications      Instructions Start Date   atomoxetine 40 MG capsule  Commonly known as: STRATTERA   No dose, route, or frequency recorded.      buPROPion  MG 12 hr tablet  Commonly known as: WELLBUTRIN SR   400 mg, Oral, 2 Times Daily      buPROPion  MG 24 hr tablet  Commonly known as: WELLBUTRIN XL   300 mg, Oral, Daily      esomeprazole 40 MG capsule  Commonly known as: nexIUM   40 mg, Oral, Every Morning Before Breakfast      VITAMIN D PO   Oral                 **Dragon Disclaimer:   Much of this encounter note is an electronic transcription/translation of spoken language to printed text. The electronic translation of spoken language may permit erroneous, or at times, nonsensical words or phrases to be inadvertently transcribed. Although I have reviewed the note for such errors, some may still exist.

## 2023-03-28 ENCOUNTER — OFFICE VISIT (OUTPATIENT)
Dept: CARDIOLOGY | Facility: CLINIC | Age: 28
End: 2023-03-28
Payer: COMMERCIAL

## 2023-03-28 VITALS
HEART RATE: 95 BPM | BODY MASS INDEX: 28.22 KG/M2 | WEIGHT: 186.2 LBS | SYSTOLIC BLOOD PRESSURE: 122 MMHG | HEIGHT: 68 IN | DIASTOLIC BLOOD PRESSURE: 84 MMHG | OXYGEN SATURATION: 98 %

## 2023-03-28 DIAGNOSIS — I10 ESSENTIAL HYPERTENSION: ICD-10-CM

## 2023-03-28 DIAGNOSIS — R00.2 PALPITATIONS: Primary | ICD-10-CM

## 2023-03-28 PROCEDURE — 99213 OFFICE O/P EST LOW 20 MIN: CPT | Performed by: NURSE PRACTITIONER

## 2023-03-28 PROCEDURE — 93000 ELECTROCARDIOGRAM COMPLETE: CPT | Performed by: NURSE PRACTITIONER

## 2023-03-28 NOTE — PROGRESS NOTES
"    CARDIOLOGY        Patient Name: Jackson Fermin Jr.  :1995  Age: 27 y.o.  Primary Cardiologist: Seth Martínez MD  Encounter Provider:  RANDY Romano    Date of Service: 23      CHIEF COMPLAINT / REASON FOR OFFICE VISIT     Palpitations and Follow-up      HISTORY OF PRESENT ILLNESS       HPI  Jackson Fermin Jr. is a 27 y.o. male who presents today for annual evaluation.     Pt has a  history significant for palpitations, hypertension.    Patient reports today stating that he has had episodes of heart palpitations that have increased over the past few weeks.  He states that a lot of times he will eat a meal and then developed heart palpitations and sometimes chest tightness.  He notes that this is not always correlated with eating but he does notice it after eating.  He reports that episodes of palpitations will be more persistent lasting for 30 minutes to an hour where episodes of chest tightness only last seconds and are more described as shooting.  He does admit that he is an anxious person.  He does exercise but not as routinely as he once did.  Denies dyspnea, dyspnea with exertion, orthopnea, lightheadedness, edema, fatigue.      The following portions of the patient's history were reviewed and updated as appropriate: allergies, current medications, past family history, past medical history, past social history, past surgical history and problem list.      VITAL SIGNS     Visit Vitals  /84 (BP Location: Left arm, Patient Position: Sitting, Cuff Size: Adult)   Pulse 95   Ht 172.7 cm (68\")   Wt 84.5 kg (186 lb 3.2 oz)   SpO2 98%   BMI 28.31 kg/m²         Wt Readings from Last 3 Encounters:   23 84.5 kg (186 lb 3.2 oz)   10/10/22 80.3 kg (177 lb)   22 77.1 kg (170 lb)     Body mass index is 28.31 kg/m².      REVIEW OF SYSTEMS   Review of Systems   Constitutional: Negative for chills, fever, weight gain and weight loss.   Cardiovascular: Positive for irregular heartbeat and " palpitations. Negative for leg swelling.   Respiratory: Negative for cough, snoring and wheezing.    Hematologic/Lymphatic: Negative for bleeding problem. Does not bruise/bleed easily.   Skin: Negative for color change.   Musculoskeletal: Negative for falls, joint pain and myalgias.   Gastrointestinal: Negative for melena.   Genitourinary: Negative for hematuria.   Neurological: Negative for excessive daytime sleepiness.   Psychiatric/Behavioral: Negative for depression. The patient is nervous/anxious.            PHYSICAL EXAMINATION     Constitutional:       Appearance: Normal appearance. Well-developed.   Eyes:      Conjunctiva/sclera: Conjunctivae normal.   Neck:      Vascular: No carotid bruit.   Pulmonary:      Effort: Pulmonary effort is normal.      Breath sounds: Normal breath sounds.   Cardiovascular:      Normal rate. Regular rhythm. Normal S1. Normal S2.      Murmurs: There is no murmur.      No gallop. No click. No rub.   Musculoskeletal: Normal range of motion. Skin:     General: Skin is warm and dry.   Neurological:      Mental Status: Alert and oriented to person, place, and time.      GCS: GCS eye subscore is 4. GCS verbal subscore is 5. GCS motor subscore is 6.   Psychiatric:         Speech: Speech normal.         Behavior: Behavior normal.         Thought Content: Thought content normal.         Judgment: Judgment normal.           REVIEWED DATA       ECG 12 Lead    Date/Time: 3/28/2023 11:25 AM  Performed by: Moni Esposito APRN  Authorized by: Moni Esposito APRN   Comparison: compared with previous ECG from 4/4/2022  Rhythm: sinus rhythm  Rate: normal  BPM: 95  Conduction: conduction normal  ST Segments: ST segments normal  T Waves: T waves normal  QRS axis: normal    Clinical impression: normal ECG            Cardiac Procedures:  1. Zio monitor 4/13/2022.  Relatively benign monitor study  2. Echocardiogram 4/28/22.  LVEF 63%.  LV diastolic function normal.  No significant valvular  stenosis or regurgitation      BUN   Date Value Ref Range Status   06/28/2022 15 9 - 21 mg/dL Final     Creatinine   Date Value Ref Range Status   06/28/2022 1.11 0.73 - 1.18 mg/dL Final     Potassium   Date Value Ref Range Status   06/28/2022 3.8 3.5 - 5.1 mmol/L Final     ALT (SGPT)   Date Value Ref Range Status   06/28/2022 27 10 - 50 U/L Final     AST (SGOT)   Date Value Ref Range Status   06/28/2022 21 10 - 50 U/L Final           ASSESSMENT & PLAN     Diagnoses and all orders for this visit:    1. Palpitations (Primary)  · Patient notes that he has been experiencing an increase in heart palpitations over the past few weeks.  States that sometimes they will last for 30 minutes to an hour at a time.  He does note that at times they correlate with eating.  He intermittently takes Nexium for GERD.  · Recommended patient keeping a diary of when palpitations occur and if they are or are not associated with eating.  I do question if this could be related to possible gastric reflux  · Patient also admits that he is anxious and this could be contributing  · At this point patient was reassured given monitor results from last year were normal and ECG is normal.  If they continue to increase in frequency or duration patient will call the office and we can place another monitor.  -     ECG 12 Lead    2. Essential hypertension  · BP controlled in clinic today at 122/84  · Continue monitoring blood pressure.          Return in about 1 year (around 3/28/2024) for Dr. Martínez- Routine.              MEDICATIONS         Discharge Medications          Accurate as of March 28, 2023 12:28 PM. If you have any questions, ask your nurse or doctor.            Continue These Medications      Instructions Start Date   buPROPion  MG 12 hr tablet  Commonly known as: WELLBUTRIN SR   400 mg, Oral, 2 Times Daily      esomeprazole 40 MG capsule  Commonly known as: nexIUM   40 mg, Oral, As Needed      VITAMIN D PO   Oral                  **Dragon Disclaimer:   Much of this encounter note is an electronic transcription/translation of spoken language to printed text. The electronic translation of spoken language may permit erroneous, or at times, nonsensical words or phrases to be inadvertently transcribed. Although I have reviewed the note for such errors, some may still exist.